# Patient Record
Sex: FEMALE | Race: WHITE
[De-identification: names, ages, dates, MRNs, and addresses within clinical notes are randomized per-mention and may not be internally consistent; named-entity substitution may affect disease eponyms.]

---

## 2021-04-23 ENCOUNTER — HOSPITAL ENCOUNTER (EMERGENCY)
Dept: HOSPITAL 8 - ED | Age: 21
Discharge: HOME | End: 2021-04-23
Payer: SELF-PAY

## 2021-04-23 VITALS — HEIGHT: 62 IN | BODY MASS INDEX: 18.91 KG/M2 | WEIGHT: 102.74 LBS

## 2021-04-23 VITALS — DIASTOLIC BLOOD PRESSURE: 76 MMHG | SYSTOLIC BLOOD PRESSURE: 124 MMHG

## 2021-04-23 DIAGNOSIS — R11.2: ICD-10-CM

## 2021-04-23 DIAGNOSIS — J36: Primary | ICD-10-CM

## 2021-04-23 PROCEDURE — 87081 CULTURE SCREEN ONLY: CPT

## 2021-04-23 PROCEDURE — 99283 EMERGENCY DEPT VISIT LOW MDM: CPT

## 2021-04-23 PROCEDURE — 87880 STREP A ASSAY W/OPTIC: CPT

## 2021-04-23 NOTE — NUR
PT STATES THAT SHE IS UNABLE TO EAT FOR 3 DAYS BECAUSE IT HURTS TO SWALLOW AND 
HAS NAUSEA. MEDICATED AS NOTED ON MAR FOR SAME